# Patient Record
Sex: MALE | Race: WHITE | NOT HISPANIC OR LATINO | URBAN - METROPOLITAN AREA
[De-identification: names, ages, dates, MRNs, and addresses within clinical notes are randomized per-mention and may not be internally consistent; named-entity substitution may affect disease eponyms.]

---

## 2018-01-24 ENCOUNTER — OFFICE VISIT CONVERTED (OUTPATIENT)
Dept: CARDIOLOGY | Facility: CLINIC | Age: 83
End: 2018-01-24
Attending: INTERNAL MEDICINE

## 2018-02-05 ENCOUNTER — OFFICE VISIT CONVERTED (OUTPATIENT)
Dept: PULMONOLOGY | Facility: CLINIC | Age: 83
End: 2018-02-05
Attending: PHYSICIAN ASSISTANT

## 2018-03-14 ENCOUNTER — OFFICE VISIT CONVERTED (OUTPATIENT)
Dept: PULMONOLOGY | Facility: CLINIC | Age: 83
End: 2018-03-14
Attending: PHYSICIAN ASSISTANT

## 2018-03-27 ENCOUNTER — OFFICE VISIT CONVERTED (OUTPATIENT)
Dept: PULMONOLOGY | Facility: CLINIC | Age: 83
End: 2018-03-27
Attending: INTERNAL MEDICINE

## 2021-05-16 VITALS
DIASTOLIC BLOOD PRESSURE: 56 MMHG | HEIGHT: 67 IN | HEART RATE: 72 BPM | WEIGHT: 221 LBS | SYSTOLIC BLOOD PRESSURE: 124 MMHG | BODY MASS INDEX: 34.69 KG/M2

## 2021-05-28 VITALS
TEMPERATURE: 98.2 F | DIASTOLIC BLOOD PRESSURE: 86 MMHG | RESPIRATION RATE: 14 BRPM | HEART RATE: 77 BPM | RESPIRATION RATE: 14 BRPM | BODY MASS INDEX: 34.06 KG/M2 | DIASTOLIC BLOOD PRESSURE: 60 MMHG | HEIGHT: 67 IN | HEART RATE: 71 BPM | OXYGEN SATURATION: 100 % | OXYGEN SATURATION: 91 % | BODY MASS INDEX: 34.06 KG/M2 | WEIGHT: 217 LBS | SYSTOLIC BLOOD PRESSURE: 110 MMHG | TEMPERATURE: 98.6 F | SYSTOLIC BLOOD PRESSURE: 117 MMHG | WEIGHT: 217 LBS | HEIGHT: 67 IN

## 2021-05-28 VITALS
WEIGHT: 224.37 LBS | SYSTOLIC BLOOD PRESSURE: 116 MMHG | BODY MASS INDEX: 36.06 KG/M2 | HEIGHT: 66 IN | RESPIRATION RATE: 18 BRPM | TEMPERATURE: 98.2 F | HEART RATE: 78 BPM | DIASTOLIC BLOOD PRESSURE: 98 MMHG | OXYGEN SATURATION: 93 %

## 2021-05-28 NOTE — PROGRESS NOTES
Patient: DAYANA MULTANI     Acct: [id number]     Report: #[id number]  UNIT #: [id number]     : 1932    Encounter Date:2018  PRIMARY CARE: ILEANA CERNA  ***Signed***  --------------------------------------------------------------------------------------------------------------------  Chief Complaint      Encounter Date      2018            Referring Provider      SELF,REFERRED PATIENT            Patient Complaint      Patient is complaining of      acute visit soa            VITALS      Height 5 ft 7 in / 170.18 cm      Weight 217 lbs 0 oz / 98.838199 kg      BSA 2.19 m2      BMI 34.0 kg/m2      Temperature 98.6 F / 37 C - Oral      Pulse 77      Respirations 14      Blood Pressure 110/86 Sitting, Right Arm      Pulse Oximetry 100%, 2      Exhaled Nitrous Oxide Testing:  10            HPI      The patient is an 85 year old white male who presented to the office in a     wheelchair on his chronic 2 liters of nasal cannula accompanied by his wife and     his daughter.  He is here with complaints of worsening shortness of breath and     dyspnea on exertion for the past week or so.  His shortness of breath is worse     if he gets up to use the bedside commode at night and relieved with rest.  He     cannot complete any of his activities of daily living without increased     shortness of breath at this time.  He is also complaining of a cough and     wheezing.  His wife reports that he normally coughs up clear sputum everyday,     but his sputum changed to yellow in the past three days. He denies fever, chills    , nausea or vomiting. He does have chronic diastolic heart failure, but denies     any increased edema, abdominal distention or orthopnea.  He is being followed     closely by Dr. Adames from cardiology and was seen last week without any changes     made to his diuretic regimen.  I reviewed his review of systems, medical,     surgical and family history and agree with those as entered in  the chart.            ROS      Constitutional:  Denies: Fatigue, Fever, Weight gain, Weight loss, Chills,     Insomnia, Other      Respiratory/Breathing:  Complains of: Shortness of air, Wheezing, Cough, Denies    : Hemoptysis, Pleuritic pain, Other      Endocrine:  Denies: Polydipsia, Polyuria, Heat/cold intolerance, Diabetes, Other      Eyes:  Denies: Blurred vision, Vision Changes, Other      Ears, nose, mouth, throat:  Denies: Mouth lesions, Thrush, Throat pain,     Hoarseness, Allergies/Hay Fever, Post Nasal Drip, Headaches, Recent Head Injury    , Nose Bleeding, Neck Stiffness, Thyroid Mass, Hearing Loss, Ear Fullness, Dry     Mouth, Nasal or Sinus Pain, Dry Lips, Nasal discharge, Nasal congestion, Other      Cardiovascular:  Denies: Palpitations, Syncope, Claudication, Chest Pain, Wake     up Gasping for air, Leg Swelling, Irregular Heart Rate, Cyanosis, Dyspnea on     Exertion, Other      Gastrointestinal:  Denies: Nausea, Constipation, Diarrhea, Abdominal pain,     Vomiting, Difficulty Swallowing, Reflux/Heartburn, Dysphagia, Jaundice, Bloating    , Melena, Bloody stools, Other      Genitourinary:  Denies: Urinary frequency, Incontinence, Hematuria, Urgency,     Nocturia, Dysuria, Testicular problems, Other      Musculoskeletal:  Denies: Joint Pain, Joint Stiffness, Joint Swelling, Myalgias    , Other      Hematologic/lymphatic:  DENIES: Lymphadenopathy, Bruising, Bleeding tendencies,     Other      Neurological:  Denies: Headache, Numbness, Weakness, Seizures, Other      Psychiatric:  Denies: Anxiety, Appropriate Effect, Depression, Other      Sleep:  No: Excessive daytime sleep, Morning Headache?, Snoring, Insomnia?,     Stop breathing at sleep?, Other      Integumentary:  Denies: Rash, Dry skin, Skin Warm to Touch, Other      Immunologic/Allergic:  Denies: Latex allergy, Seasonal allergies, Asthma,     Urticaria, Eczema, Other            FAMILY/SOCIAL/MEDICAL HX      Current History      heavy smoker  (2-3 ppd x 40 years). stopped smoking 30 years ago      denies alcohol or drugs      Surgical History:  Yes: AAA Repair, Head Surgery (RT EYE CATARACT), Orthopedic     Surgery (LEFT ARM/HAND TRIGGER RELEASE SURGERY), Vascular Surgery (AAA REPAIR),     No: Abdominal Surgery, Angioplasty, Appendectomy, Back Surgery, Bladder Surgery    , Bowel Surgery, Breast Surgery, CABG, Carotid Stenosis, Cholecystectomy, Ear     Surgery, Eye Surgery, Hernia Surgery, Kidney Surgery, Nose Surgery, Oral Surgery    , Prostatectomy, Rectal Surgery, Spinal Surgery, Testicular Surgery, Throat     Surgery, Valve Replacement, Other Surgeries      Heart - Family Hx:  Father      Diabetes - Family Hx:  Child      Cancer/Type - Family Hx:  Brother (throat)      Other Family Medical History:  Mother (pneumonia)      Is Father Still Living?:  No      Is Mother Still Living?:  No      Social History:  No Tobacco Use, No Alcohol Use, No Recreational Drug use      Smoking status:  Former smoker (2-3 packs/ day/ 50 years/ chew 2-3 years)      Occupation:  farmer      Anticoagulation Therapy:  Yes      Antibiotic Prophylaxis:  No      Medical History:  Yes: Arthritis (HANDS), Broken Bones (hands broke),     Chemotherapy/Cancer (skin), Chronic Bronchitis/COPD, Emphysema, Congestive     Heart Failu, High Blood Pressure, Shortness Of Breath, Sinus Trouble (sinus     ducts taken out 40 years ago), No: Alcoholism, Allergies, Anemia, Asthma, Blood     Disease, Cataracts, Chemical Dependency, Chronic Liver Disease, Colon Trouble,     Colitis, Diverticulitis, Deafness or Ringing Ears ([place]), Convulsions, Depression    , Anxiety, Bipolar Disorder, Diabetes, Epilepsy, Seizures, Forgetfullness,     Glaucoma, Gall Stones, Gout, Head Injury, Heart Attack, Heart Murmur,     Hemorrhoids/Rectal Prob, Hepatitis, Hiatal Hernia, High Cholesterol, HIV (Do     not ask - volu, Jaundice, Kidney or Bladder Disease, Kidney Stones, Migrane     Headaches, Mitral Valve Prolapse,  Night sweats, Phlebitis, Psychiatric Care,     Reflux Disease, Rheumatic Fever, Sexually Transmitted Dis, Skin Disease/Psoriais    /Ecz, Stroke, Thyroid Problem, Tuberculosis or Pos TB Te, Miscellaneous Medical/    oth            Hx Influenza Vaccination:  Yes      Date Influenza Vaccine Given:  Oct 1, 2017      Influenza Vaccine Declined:  No      2 or More Falls Past Year?:  Yes      Fall Past Year with Injury?:  No      Hx Pneumococcal Vaccination:  Yes      Encouraged to follow-up with:  PCP regarding preventative exams.      Chart initiated by      ortiz cox/ ma            ALLERGIES/MEDICATIONS      Allergies:        Coded Allergies:             LEVOFLOXACIN (Verified  Allergy, Unknown, RASH, 2/5/18)      Medications    Last Reconciled on 2/5/18 16:14 by RICKEY LOVE PA-C      predniSONE* (predniSONE*) 10 Mg Tablet      10 MG PO ASDIR, #90 TAB         Prov: Lucila Love PA-C         2/5/18       Amoxicillin/Clavulanic Acid 500/125 (Augmentin 500/125) 1 Each Tablet      500 MG PO BID for 7 Days, #14 TAB         Prov: Lucila Love PA-C         2/5/18       Tiotropium Bromide (Spiriva Respimat 2.5 mcg/Puff) 4 Gm Mist.inhal      2 PUFFS INH RTQDAY, #1 MDI 6 Refills         Prov: Lucila Love PA-C         2/5/18       NEB-Levalbuterol (LEVALBUTEROL HCL) 1.25 Mg/3 Ml Vial.neb      1.25 MG INH RTQ4H Y for SHORTNESS OF BREATH, #180 NEB 6 Refills         Prov: Reggie Jara         2/5/18       Levofloxacin (Levaquin*) 750 Mg Tablet      750 MG PO QDAY for 7 Days, #7 TAB 0 Refills         Prov: Reggie Jara         12/27/17       Cefdinir (CEFDINIR) 300 Mg Cap      300 MG PO BID for 5 Days, #10 CAP         Prov: Reggie Jara         12/5/17       Trimethoprim/Sulfamethoxazole DS (Bactrim /160 MG*) 1 Each Tablet      1 TAB PO BID for 7 Days, #14 TAB 0 Refills         Prov: Reggie Jara         10/30/17       Trimethoprim/Sulfamethoxazole DS (Bactrim /160 MG*) 1 Each Tablet      1 TAB PO QDAY for 7 Days,  #7 TAB 0 Refills         Prov: MarekReggie         10/24/17       Albuterol/Ipratropium (Duoneb) 3 Ml Ampul.neb      3 ML INH RTQID, #120 NEB 9 Refills         Prov: MarekReggie         10/20/17       MDI-Albuterol (Ventolin HFA*) 18 Gm Hfa.aer.ad      2 PUFFS INH Q4-6H Y for SHORTNESS OF BREATH, #1 MDI 3 Refills         Prov: TashiReggie truong         10/20/17       Citalopram HBr (CeleXA) 10 Mg Tablet      10 MG PO QDAY, #30 TAB 0 Refills         Reported         10/11/17       Spironolactone (Spironolactone*) 50 Mg Tablet      25 MG PO QDAY, #30 TAB 5 Refills         Prov: TashiReggie truong         10/11/17       Ipratropium Bromide (Atrovent HFA 17 Mcg) 12.9 Gm Hfa.aer.ad      2 PUFF INH RTTID, #1 INH         Prov: TashiReggie truong         9/26/17       Neb-Budesonide (Budesonide) 0.5 Mg/2 Ml Ampul.neb      0.5 MG INH RTBID, #60 NEB         Prov: KELVIN CARABALLO         8/28/17       Iron Polysaccharides Complex (Ferrex-150) 150 Mg Capsule      150 MG PO QDAY, #30 CAP         Prov: KELVIN CARABALLO         8/28/17       Ondansetron HCl (Zofran*) 4 Mg Tablet      4 MG PO BID Y for NAUSEA, #20 TAB 0 Refills         Prov: KELVIN CARABALLO         8/28/17       Arformoterol Tartrate (Brovana) 15 Mcg/2 Ml Vial.neb      15 MCG INH RTBID, #60 NEB         Prov: KELVIN CARABALLO         8/28/17       Metolazone (Zaroxolyn*) 5 Mg Tablet      5 MG PO tuesday and Friday for 30 Days, #30 TAB 5 Refills         Prov: OSVALDO MOULTON         8/28/17       Rosuvastatin Calcium (Crestor*) 10 Mg Tablet      10 MG PO HS, #30 TAB 6 Refills         Prov: OSVALDO MOULTON         8/28/17       Warfarin Sod (Coumadin) 1 Mg Tablet      1 MG PO QDAY@16, #30 TAB 5 Refills         Prov: OSVALDO MOULTON         8/28/17       Diltiazem CD (Cardizem CD) 120 Mg Capcr      120 MG PO QDAY@12, #30 CAP.ER 5 Refills         Prov: OSVALDO MOULTON         8/28/17       Carvedilol (Carvedilol) 25 Mg Tablet      50 MG PO BID, #120 TAB 5 Refills         Prov: OSVALDO MOULTON          8/28/17       Torsemide (Demadex) 20 Mg Tablet      20 MG PO QDAY, #30 TAB         Prov: OSVALDO MOULTON         8/28/17       Azithromycin (Azithromycin) 250 Mg Tablet      250 MG PO Q24HR, #30 TAB 9 Refills         Prov: Reggie Jara         6/2/17       Tamsulosin HCL (Flomax*) 0.4 Mg Cap.er.24h      0.4 MG PO HS, #30 CAP 0 Refills         Reported         6/2/17       Senna (Senokot) 8.6 Mg Tablet      8.6 MG PO QDAY, #30 TAB         Prov: STEFANIA MIRZAA         4/21/17       Polyethylene Glycol (Miralax*) 17 Gm Packet      17 GM PO QDAY Y for CONSTIPATION, #30 PACKET         Prov: STEFANIA MIRZAA         4/21/17       Wilbur-Fluticasone (Fluticasone 50 mcg) 16 Gm Spray.susp      2 PUFFS NARE EACH QDAY, #1 BOTTLE 0 Refills         Reported         4/13/17       Acetaminophen* (Tylenol*) 325 Mg Tablet      650 MG PO BID, TAB         Reported         4/13/17       Ranitidine Hcl (Ranitidine*) 150 Mg Tablet      150 MG PO BID, #60 TAB 0 Refills         Reported         4/13/17       Levothyroxine (Levothyroxine) 0.1 Mg Tablet      0.1 MG PO QDAY@07, #30 TAB 0 Refills         Reported         4/13/17      Current Medications      Current Medications Reviewed 2/5/18            EXAM      CONSTITUTIONAL: Pleasant  normal conversant.       EYES : Pink conjunctive, no ptosis, PERRL.       ENMT : Nose and ears appear normal, normal dentition, mild posterior pharyngeal     wall erythema, no sinus tenderness. Mallampati classification       Neck: Nontender, no masses, no thyromegaly, no nodules.      Resp : Decreased breath sounds to auscultation throughout with trace expiratory     wheezes appreciated.        CVS  : No carotid bruits, s1s2 nl, RRR, no murmur, rubs or gallop, no     peripheral edema       Chest wall: Normal rise with inspiration, nontender on palpation.      GI   : Abdomen soft, with no masses, no hepatosplenomegaly, no hernias, BS+      MSK  : Bilateral lower extremities are warm and dry with trace pitting edema.         Skin : No rashes, ulcerations or lesions, normal turgor and temperature      Neuro: CN II - XII intact, no sensory deficits, DTRs intact and symmetrical, no     motor weakness      Psych: Appropriate affect, A   Vtials      Vitals:             Height 5 ft 7 in / 170.18 cm           Weight 217 lbs 0 oz / 98.930002 kg           BSA 2.19 m2           BMI 34.0 kg/m2           Temperature 98.6 F / 37 C - Oral           Pulse 77           Respirations 14           Blood Pressure 110/86 Sitting, Right Arm           Pulse Oximetry 100%, 2            REVIEW      Results Reviewed      PCCS Results Reviewed?:  Yes Prev Lab Results, Yes Prev Radiology Results, Yes     Previous [facility] Records      Lab Results      I have personally reviewed his prior provider notes, lab work and imaging.      Radiographic Results               [facility] Diagnostic Img                PACS RADIOLOGY REPORT            Patient: DAYANA MULTANI   Acct: #[id number]   Report: #[id number]            UNIT #: [id number]    DOS: 18 1659      INSURANCE:MEDICARE PART A   LOCATION:[facility]     : 1932            PROVIDERS      ADMITTING:     ATTENDING: Lucila Love PA-C      FAMILY:  NONE,MD   ORDERING:  Lucila Love PA-C         OTHER:    DICTATING:  Amado Mitchell MD            REQ #:[id number]   EXAM:CXR2 - CHEST 2V AP PA LAT      REASON FOR EXAM:        REASON FOR VISIT:  PULMONARY DISEASE/BRONCHITIS/RESPIRATORY FAILURE            *******Signed******         PROCEDURE:   CHEST AP/PA AND LATERAL             COMPARISON:   [facility], , CXR PORTABLE, 2017, 13:28.             INDICATIONS:   COPD, CKD AND CHF. CHRONIC SOA, WORSE IN THE LAST 2 WEEKS.             FINDINGS:         LUNGS:   Chronic lung disease unchanged      VASCULATURE:   Normal.  Unremarkable pulmonary vasculature.        CARDIAC:   Normal.  No cardiac silhouette abnormality or cardiomegaly.        MEDIASTINUM:    Normal.  No visible mass or adenopathy.        PLEURA:   Normal.  No effusion or pleural thickening.        BONES:   Normal.  No fracture or visible bony lesion.        OTHER:   Negative.               CONCLUSION:         1. No acute disease in the chest. chronic lung disease unchanged              BUNNY ALLISON MD             Electronically Signed and Approved By: BUNNY ALLISON MD on 2/05/2018 at     17:24                        Until signed, this is an unconfirmed preliminary report that may contain      errors and is subject to change.                                              BRITTA:      D:02/05/18 1724            PLAN      Assessment      COPD with acute exacerbation - J44.1            Cavitating mass in left upper lung lobe - R91.8            Hypoxia - R09.02            Acute bronchitis - J20.9            Diastolic CHF, acute on chronic - I50.33            CKD (chronic kidney disease) stage 3, GFR 30-59 ml/min - N18.3            Chronic respiratory failure with hypoxia - J96.11            Notes      New Medications      * TIOTROPIUM BROMIDE (Spiriva Respimat 2.5 mcg/Puff) 4 GM MIST.INHAL: 2 PUFFS     INH RTQDAY #1       Dx: COPD with acute exacerbation - J44.1      * Amoxicillin/Clavulanic Acid 500/125 (Augmentin 500/125) 1 EACH TABLET: 500 MG     PO BID 7 Days #14       Dx: COPD with acute exacerbation - J44.1      * predniSONE* 10 MG TABLET: 10 MG PO ASDIR #90       Instructions: 62wbh5m,32arp1j,43txe8c,56uil3q,25pfb8u       Dx: COPD with acute exacerbation - J44.1      New Diagnostics      * Chest 2 View, As Soon As Possible       Dx: COPD with acute exacerbation - J44.1      * CBC, As Soon As Possible       Dx: COPD with acute exacerbation - J44.1      * Sputum Culture W/Gram Stain, As Soon As Possible       Dx: COPD with acute exacerbation - J44.1      New Office Procedures      * Solu-Medrol 125 MG, As Soon As Possible       METHYLPRED SOD SUCC (Solu-Medrol) 125 MG VIAL: 125 MILLIGRAM  INTRAMUSC Qty 1     VIAL       Dx: COPD with acute exacerbation - J44.1      ASSESSMENT:       1.  COPD with acute exacerbation.      2.  Chronic respiratory failure with hypoxia, chronically on 2 liters nasal     cannula.      3. Cavitating mass in the left upper lung lobe.      4.  Acute bronchitis.      5.  Chronic diastolic congestive heart failure.      6.  Chronic kidney disease, stage 3.        7.  Coughing.      8.  Wheezing.      9.  Dyspnea on exertion.            PLAN:        1.  A this time I will check a two view chest x-ray, sputum culture and CBC for     him.        2.  I will also give him a one time dose of IM Solu Medrol 125 mg in the office     today as well as a DuoNeb in the office today.  I have instructed him to     continue taking his Brovana and Pulmicort twice daily and DuoNebs as needed.       3.  I am also starting him on Spiriva Respimat 2.5 two puffs daily and I have     given  him samples of this and shown him how to use that today.       4.  I am also starting him on Augmentin 500/25  twice a day for 7 days as well     as a month long prednisone taper.        5.  I have instructed him and his family to call the office for any questions     or concerns or if he is not improving in the next 48/72 hours.  They verbalized     their understanding of these plans.            Patient Education      Patient Education Provided:  Acute Bronchitis, COPD            Patient Education:        Acute Bronchitis                 Disclaimer: Converted document may not contain table formatting or lab diagrams. Please see [employer] for the authenticated document.

## 2021-05-28 NOTE — PROGRESS NOTES
Patient: DAYANA MULTANI     Acct: [id number]     Report: #[id number]  UNIT #: [id number]     : 1932    Encounter Date:2018  PRIMARY CARE: ILEANA CERNA  ***Signed***  --------------------------------------------------------------------------------------------------------------------  Chief Complaint      Encounter Date      Mar 14, 2018            Referring Provider      SELF,REFERRED PATIENT            Patient Complaint      Patient is complaining of       Shortness Of Air, Weight Gain, Cough            VITALS      Height 5 ft 7 in / 170.18 cm      Weight 217 lbs 0 oz / 98.499454 kg      BSA 2.19 m2      BMI 34.0 kg/m2      Temperature 98.2 F / 36.78 C - Oral      Pulse 71      Respirations 14      Blood Pressure 117/60 Sitting, Right Arm      Pulse Oximetry 91%, roomair@rest      Exhaled Nitrous Oxide Testing:  10            HPI      The patient is a 85 year old white male who presents with his daughter today     for a complaint of worsening dyspnea on exertion as well as cough productive of     white to yellow sputum and wheezing. They report that these symptoms have been     going on for about 3-4 days. He also admits to some mild lower extremity edema     but states that with his diuretics he lost 3 pounds overnight. He is followed     closely by Dr. Adames for his diastolic congestive heart failure.  He denies     fevers or chills or body aches, denies hemoptysis or chest tightness. He     reports that he is still using Brovana and Pulmicort nebs as scheduled twice     daily and has been using DuoNeb several times per day in between. He is also     using Spiriva Respimat two puffs daily. He is primarily in a wheelchair but     becomes more short of breath with exertion, better with rest.             I have personally reviewed the Review of Systems, past family, social, surgical     and medical histories and I agree with the findings.            ROS      Constitutional:  Denies: Fatigue, Fever,  Weight gain, Weight loss, Chills,     Insomnia, Other      Respiratory/Breathing:  Complains of: Shortness of air, Denies: Wheezing, Cough    , Hemoptysis, Pleuritic pain, Other      Endocrine:  Denies: Polydipsia, Polyuria, Heat/cold intolerance, Diabetes, Other      Eyes:  Denies: Blurred vision, Vision Changes, Other      Ears, nose, mouth, throat:  Denies: Congestion, Dysphagia, Hearing Changes,     Nose Bleeding, Nasal Discharge, Throat pain, Tinnitus, Other      Cardiovascular:  Denies: Chest Pain, Exertional dyspnea, Peripheral Edema,     Palpitations, Syncope, Wake up Gasping for air, Orthopnea, Tachycardia, Other      Gastrointestinal:  Denies: Abdominal pain/cramping, Bloody stools, Constipation    , Diarrhea, Melena, Nausea, Vomiting, Other      Genitourinary:  Denies: Dysuria, Urinary frequency, Incontinence, Hematuria,     Urgency, Other      Musculoskeletal:  Denies: Joint Pain, Joint Stiffness, Joint Swelling, Myalgias    , Other      Hematologic/lymphatic:  DENIES: Lymphadenopathy, Bruising, Bleeding tendencies,     Other      Neurologic:  Denies: Headache, Numbness, Weakness, Seizures, Other      Psychiatric:  Denies: Anxiety, Appropriate Effect, Depression, Other      Sleep:  No: Excessive daytime sleep, Morning Headache?, Snoring, Insomnia?,     Stop breathing at sleep?, Other      Integumentary:  Denies: Rash, Dry skin, Skin Warm to Touch, Other            FAMILY/SOCIAL/MEDICAL HX      Current History      heavy smoker (2-3 ppd x 40 years). stopped smoking 30 years ago      denies alcohol or drugs      Surgical History:  Yes: AAA Repair, Head Surgery (RT EYE CATARACT), Orthopedic     Surgery (LEFT ARM/HAND TRIGGER RELEASE SURGERY), Vascular Surgery (AAA REPAIR),     No: Abdominal Surgery, Angioplasty, Appendectomy, Back Surgery, Bladder Surgery    , Bowel Surgery, Breast Surgery, CABG, Carotid Stenosis, Cholecystectomy, Ear     Surgery, Eye Surgery, Hernia Surgery, Kidney Surgery, Nose Surgery,  Oral Surgery    , Prostatectomy, Rectal Surgery, Spinal Surgery, Testicular Surgery, Throat     Surgery, Valve Replacement, Other Surgeries      Heart - Family Hx:  Father      Diabetes - Family Hx:  Child      Cancer/Type - Family Hx:  Brother (throat)      Other Family Medical History:  Mother (pneumonia)      Is Father Still Living?:  No      Is Mother Still Living?:  No      Social History:  No Tobacco Use, No Alcohol Use, No Recreational Drug use      Smoking status:  Former smoker (2-3 packs/ day/ 50 years/ chew 2-3 years)      Occupation:  farmer      Anticoagulation Therapy:  Yes      Antibiotic Prophylaxis:  No      Medical History:  Yes: Arthritis (HANDS), Broken Bones (hands broke),     Chemotherapy/Cancer (skin), Chronic Bronchitis/COPD, Emphysema, Congestive     Heart Failu, High Blood Pressure, Shortness Of Breath, Sinus Trouble (sinus     ducts taken out 40 years ago), No: Alcoholism, Allergies, Anemia, Asthma, Blood     Disease, Cataracts, Chemical Dependency, Chronic Liver Disease, Colon Trouble,     Colitis, Diverticulitis, Deafness or Ringing Ears ([place]), Convulsions, Depression    , Anxiety, Bipolar Disorder, Diabetes, Epilepsy, Seizures, Forgetfullness,     Glaucoma, Gall Stones, Gout, Head Injury, Heart Attack, Heart Murmur,     Hemorrhoids/Rectal Prob, Hepatitis, Hiatal Hernia, High Cholesterol, HIV (Do     not ask - volu, Jaundice, Kidney or Bladder Disease, Kidney Stones, Migrane     Headaches, Mitral Valve Prolapse, Night sweats, Phlebitis, Psychiatric Care,     Reflux Disease, Rheumatic Fever, Sexually Transmitted Dis, Skin Disease/Psoriais    /Ecz, Stroke, Thyroid Problem, Tuberculosis or Pos TB Te, Miscellaneous Medical/    oth            Hx Influenza Vaccination:  Yes      Date Influenza Vaccine Given:  Oct 1, 2017      Influenza Vaccine Declined:  No      2 or More Falls Past Year?:  Yes      Fall Past Year with Injury?:  No      Hx Pneumococcal Vaccination:  Yes      Encouraged to  follow-up with:  PCP regarding preventative exams.      Chart initiated by      gurdeep price ma            ALLERGIES/MEDICATIONS      Allergies:        Coded Allergies:             LEVOFLOXACIN (Verified  Allergy, Unknown, RASH, 3/14/18)      Medications    Last Reconciled on 3/14/18 11:56 by RICKEY PETERSON PA-C      Trimethoprim/Sulfamethoxazole DS (Bactrim /160 MG*) 1 Each Tablet      1 TAB PO BID for 7 Days, #14 TAB 0 Refills         Prov: Lucila Peterson PA-C         3/14/18       predniSONE* (predniSONE*) 20 Mg Tablet      40 MG PO QDAY for 7 Days, #14 TAB 0 Refills         Prov: Lucila Peterson PA-C         3/14/18       Rosuvastatin Calcium (Crestor*) 10 Mg Tablet      10 MG PO HS, #30 TAB 0 Refills         Reported         3/14/18       Tiotropium Bromide (Spiriva Respimat 2.5 mcg/Puff) 4 Gm Mist.inhal      2 PUFFS INH RTQDAY, #1 MDI 6 Refills         Prov: Lucila Peterson PA-C         2/5/18       NEB-Levalbuterol (LEVALBUTEROL HCL) 1.25 Mg/3 Ml Vial.neb      1.25 MG INH RTQ4H Y for SHORTNESS OF BREATH, #180 NEB 6 Refills         Prov: Reggie Jara         2/5/18       Albuterol/Ipratropium (Duoneb) 3 Ml Ampul.neb      3 ML INH RTQID, #120 NEB 9 Refills         Prov: Reggie Jara         10/20/17       MDI-Albuterol (Ventolin HFA*) 18 Gm Hfa.aer.ad      2 PUFFS INH Q4-6H Y for SHORTNESS OF BREATH, #1 MDI 3 Refills         Prov: Reggie Jara         10/20/17       Citalopram HBr (CeleXA) 10 Mg Tablet      10 MG PO QDAY, #30 TAB 0 Refills         Reported         10/11/17       Spironolactone (Spironolactone*) 50 Mg Tablet      25 MG PO QDAY, #30 TAB 5 Refills         Prov: Reggie Jara         10/11/17       Ipratropium Bromide (Atrovent HFA 17 Mcg) 12.9 Gm Hfa.aer.ad      2 PUFF INH RTTID, #1 INH         Prov: Reggie Jara         9/26/17       Neb-Budesonide (Budesonide) 0.5 Mg/2 Ml Ampul.neb      0.5 MG INH RTBID, #60 NEB         Prov: KELVIN CARABALLO         8/28/17       Iron Polysaccharides Complex  (Ferrex-150) 150 Mg Capsule      150 MG PO QDAY, #30 CAP         Prov: KELVIN CARABALLO         8/28/17       Ondansetron HCl (Zofran*) 4 Mg Tablet      4 MG PO BID Y for NAUSEA, #20 TAB 0 Refills         Prov: KELVIN CARABALLO         8/28/17       Arformoterol Tartrate (Brovana) 15 Mcg/2 Ml Vial.neb      15 MCG INH RTBID, #60 NEB         Prov: KELVIN CARABALLO         8/28/17       Metolazone (Zaroxolyn*) 5 Mg Tablet      5 MG PO tuesday and Friday for 30 Days, #30 TAB 5 Refills         Prov: MOU[facility]         8/28/17       Rosuvastatin Calcium (Crestor*) 10 Mg Tablet      10 MG PO HS, #30 TAB 6 Refills         [facility]         8/28/17       Warfarin Sod (Coumadin) 1 Mg Tablet      1 MG PO QDAY@16, #30 TAB 5 Refills         Prov: [facility]         8/28/17       Diltiazem CD (Cardizem CD) 120 Mg Capcr      120 MG PO QDAY@12, #30 CAP.ER 5 Refills         Prov: MOULTON,[facility]         8/28/17       Carvedilol (Carvedilol) 25 Mg Tablet      50 MG PO BID, #120 TAB 5 Refills         [facility]         8/28/17       Torsemide (Demadex) 20 Mg Tablet      20 MG PO QDAY, #30 TAB         [facility]         8/28/17       Azithromycin (Azithromycin) 250 Mg Tablet      250 MG PO Q24HR, #30 TAB 9 Refills         Prov: Reggie Jara         6/2/17       Tamsulosin HCL (Flomax*) 0.4 Mg Cap.er.24h      0.4 MG PO HS, #30 CAP 0 Refills         Reported         6/2/17       Senna (Senokot) 8.6 Mg Tablet      8.6 MG PO QDAY, #30 TAB         Prov: ANDRES MIRZA         4/21/17       Polyethylene Glycol (Miralax*) 17 Gm Packet      17 GM PO QDAY Y for CONSTIPATION, #30 PACKET         Prov: ANDRES MIRZA         4/21/17       Wilbur-Fluticasone (Fluticasone 50 mcg) 16 Gm Spray.susp      2 PUFFS NARE EACH QDAY, #1 BOTTLE 0 Refills         Reported         4/13/17       Acetaminophen* (Tylenol*) 325 Mg Tablet      650 MG PO BID, TAB         Reported         4/13/17       Ranitidine Hcl (Ranitidine*) 150 Mg Tablet       150 MG PO BID, #60 TAB 0 Refills         Reported         4/13/17       Levothyroxine (Levothyroxine) 0.1 Mg Tablet      0.1 MG PO QDAY@07, #30 TAB 0 Refills         Reported         4/13/17      Current Medications      Current Medications Reviewed 3/14/18            EXAM      CONSTITUTIONAL: Pleasant male in no acute distress,  normal conversant.       EYES : Pink conjunctive, no ptosis, PERRL.       ENMT : Nose and ears appear normal, normal dentition, mild posterior pharyngeal     wall erythema. Mallampati classification       Neck: Nontender, no masses, no thyromegaly, no nodules.      Resp : Mildly decreased breath sounds throughout, resonant to percussion     bilaterally, no wheezing, crackles or rhonchi.      CVS  : No carotid bruits, s1s2 nl, RRR, no murmur, rubs or gallop, trace to +1     pitting edema       Chest wall: Normal rise with inspiration, nontender on palpation      GI   : Abdomen soft, with no masses, no hepatosplenomegaly, no hernias, BS+      MSK  : Normal gait and station, no digital cyanosis or clubbing, bilateral     lower extremities are warm and dry      Skin : No rashes, ulcerations or lesions, normal turgor and temperature      Neuro: CN II - XII intact, no sensory deficits, DTRs intact and symmetrical, no     motor weakness      Psych: Appropriate affect, A   Vitals      Vitals:             Height 5 ft 7 in / 170.18 cm           Weight 217 lbs 0 oz / 98.118127 kg           BSA 2.19 m2           BMI 34.0 kg/m2           Temperature 98.2 F / 36.78 C - Oral           Pulse 71           Respirations 14           Blood Pressure 117/60 Sitting, Right Arm           Pulse Oximetry 91%, roomair@rest            REVIEW      Results Reviewed      P[facility]S Results Reviewed?:  Yes Prev Lab Results, Yes Prev Radiology Results, Yes     [facility]            PLAN      Assessment      Notes      New Medications      * Rosuvastatin Calcium (Crestor*) 10 MG TABLET: 10 MG PO HS #30      *  predniSONE* 20 MG TABLET: 40 MG PO QDAY 7 Days #14      * Trimethoprim/Sulfamethoxazole DS (Bactrim /160 MG*) 1 EACH TABLET: 1     TAB PO BID 7 Days #14      * Ciprofloxacin HCl (Ciprofloxacin*) 750 MG TABLET: 750 MG PO BID 14 Days #28       Instructions: 1 TAB      * LACTOBAC CMB #3/FOS/PANTETHINE (Probiotic   1 EACH PO BID 14 Days #28      * Neb-Tobramycin (Carlos 300 mg/5 ml Solution) 300 MG/5 ML AMPUL.NEB: 300 MG INH     RTQ12H 30 Days #60       Instructions: DIAGNOSIS CODE REQUIRED PRIOR TO PRESCRIBING.      Renewed Medications      * Albuterol/Ipratropium (Duoneb) 3 ML AMPUL.NEB: 3 ML INH RTQID #120       Instructions: DIAGNOSIS j44.9 CODE npi: [id number]      * NEB-Levalbuterol (LEVALBUTEROL HCL) 1.25 MG/3 ML VIAL.NEB: 1.25 MG INH RTQ4H     PRN SHORTNESS OF BREATH #180       Instructions: dx:j44.9 copd npi:[id number]       Dx: COPD with acute exacerbation - J44.1      Discontinued Medications      * Trimethoprim/Sulfamethoxazole DS (Bactrim /160 MG*) 1 EACH TABLET: 1     TAB PO QDAY 7 Days #7      * Trimethoprim/Sulfamethoxazole DS (Bactrim /160 MG*) 1 EACH TABLET: 1     TAB PO BID 7 Days #14      * CEFDINIR 300 MG CAP: 300 MG PO BID 5 Days #10      * Levofloxacin (Levaquin*) 750 MG TABLET: 750 MG PO QDAY 7 Days #7      * Amoxicillin/Clavulanic Acid 500/125 (Augmentin 500/125) 1 EACH TABLET: 500 MG     PO BID 7 Days #14       Dx: COPD with acute exacerbation - J44.1      * predniSONE* 10 MG TABLET: 10 MG PO ASDIR #90       Instructions: [id number],27gzq5l,30lyp6m,20abj4f,90fsk5g       Dx: COPD with acute exacerbation - J44.1      New Office Procedures      * Solu-Medrol 62.5 MG, As Soon As Possible       METHYLPRED SOD SUCC (Solu-Medrol) 125 MG VIAL: 62.5 MILLIGRAM INTRAMUSC Qty 1     VIAL      ASSESSMENT:       1.  COPD with acute exacerbation.      2.  Chronic respiratory failure with hypoxia, chronically on 2 liters nasal     cannula.      3. Probable acute bronchitis.      4. Cavitating mass  in left upper lobe.       5.  Chronic diastolic congestive heart failure.      6.  Chronic kidney disease, stage III.        7.  Coughing.      8.  Wheezing.      9.  Dyspnea on exertion.            PLAN:        1.  A this time I will have the patient continue his Brovana and Pulmicort     twice daily, continue DuoNeb PRN and Spiriva Respimat two puffs twice daily.         2.  I will start him on Bactrim for a week as his last sputum culture grew     Stenotrophomonas which is susceptible to Bactrim. I will also start him on a     one week Prednisone dose and give him intramuscular shot of Solu-Medrol 62.5 mg      in the office today.       3.  He brought a sputum sample to the laboratory yesterday so I will follow up     on that and adjust his antibiotic therapy if needed.       4. He should keep his next follow up appointment with Dr. Jara in 2 weeks and     to call us sooner if needed.              ADDENDUM:       1.  Patient's sputum culture grew multi-drug resistant Serratia and Psuedomonas    , requiring a different antibiotic. I will discontinue Bactrim and start a 2-    week course of Ciprofloxacin. 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            Patient Education      Patient Education Provided:  Acute Bronchitis, COPD                 Disclaimer: Converted document may not contain table formatting or lab diagrams. Please see [employer] for the authenticated document.

## 2021-05-28 NOTE — PROGRESS NOTES
Patient: DAYANA MULTANI     Acct: [id number]     Report: #[id number]  UNIT #: [id number]     : 1932    Encounter Date:2018  PRIMARY CARE: ILEANA CERNA  ***Signed***  --------------------------------------------------------------------------------------------------------------------  ADDENDUM: 18 1452          Addendum: Reggie Jara on 3/30/18 @ 14:52      Addendum      Chart Notes      Patients sputum culture sensitivity reviewed, showed intermediate sensitivity     to quinolones.       Patient continues to have fever, worsening shortness of breath.      With his current clinical status, would need carbapenem. Start meropenem 1 gm     bid (renally dosed).       Would need it for 20 days, meanwhile continue darian neb and completing cipro.            Chief Complaint      Encounter Date      Mar 27, 2018            Primary Care Provider      IELANA CERNA            Referring Provider      SELF,REFERRED PATIENT            Patient Complaint      Patient is complaining of      5 week follow up            VITALS      Height 5 ft 6 in / 167.64 cm      Weight 224 lbs 6 oz / 101.517801 kg      BSA 2.22 m2      BMI 36.2 kg/m2      Temperature 98.2 F / 36.78 C - Oral      Pulse 78      Respirations 18      Blood Pressure 116/98 Sitting, Right Arm      Pulse Oximetry 93%, nasal cannula, 3 lpm      Exhaled Nitrous Oxide Testing:  10            HPI      The patient is a 85 year old male with chronic obstructive pulmonary disease     with recurrent exacerbations, chronic hypoxic and hypercarbic respiratory     failure, chronic diastolic congestive heart failure and chronic kidney disease.     He is here for follow up.             Since his last office visit he was seen by Lucila Love our PA. At that time a     sputum culture was checked which grew multidrug resistant serratia marcescens     as well as pseudomonas paucimobilis. It was sensitive to levofloxacin and     tobramycin. We do not have the culture  and sensitivity back on pseudomonas. He     is currently taking ciprofloxacin once daily. He has taken it for 1.5 weeks     now. He has no fevers or chills, no nausea and vomiting. No chest pain. He is     coughing up clear phlegm. He has been on spironolactone 25 mg once daily and     torsemide 20 mg once daily. His kidney function has been stable. He follows Dr. Bowers for his chronic kidney disease and Dr. Adames for his congestive heart     failure. He is on Brovana and Pulmicort nebulizers twice daily, DuoNeb     nebulizer 3-4 times and Spiriva once daily. His wife does not know if he is     able to inhale the Spiriva very well.            ROS      Constitutional:  Complains of: Fatigue, Denies: Fever, Weight gain, Weight loss    , Chills, Insomnia, Other      Respiratory/Breathing:  Complains of: Shortness of air, Wheezing, Denies: Cough    , Hemoptysis, Pleuritic pain, Other      Endocrine:  Denies: Polydipsia, Polyuria, Heat/cold intolerance, Diabetes, Other      Eyes:  Denies: Blurred vision, Vision Changes, Other      Ears, nose, mouth, throat:  Denies: Mouth lesions, Thrush, Throat pain,     Hoarseness, Allergies/Hay Fever, Post Nasal Drip, Headaches, Recent Head Injury    , Nose Bleeding, Neck Stiffness, Thyroid Mass, Hearing Loss, Ear Fullness, Dry     Mouth, Nasal or Sinus Pain, Dry Lips, Nasal discharge, Nasal congestion, Other      Cardiovascular:  Denies: Palpitations, Syncope, Claudication, Chest Pain, Wake     up Gasping for air, Leg Swelling, Irregular Heart Rate, Cyanosis, Dyspnea on     Exertion, Other      Gastrointestinal:  Denies: Nausea, Constipation, Diarrhea, Abdominal pain,     Vomiting, Difficulty Swallowing, Reflux/Heartburn, Dysphagia, Jaundice, Bloating    , Melena, Bloody stools, Other      Genitourinary:  Denies: Urinary frequency, Incontinence, Hematuria, Urgency,     Nocturia, Dysuria, Testicular problems, Other      Musculoskeletal:  Denies: Joint Pain, Joint Stiffness, Joint  Swelling, Myalgias    , Other      Hematologic/lymphatic:  DENIES: Lymphadenopathy, Bruising, Bleeding tendencies,     Other      Neurological:  Denies: Headache, Numbness, Weakness, Seizures, Other      Psychiatric:  Denies: Anxiety, Appropriate Effect, Depression, Other      Sleep:  No: Excessive daytime sleep, Morning Headache?, Snoring, Insomnia?,     Stop breathing at sleep?, Other      Integumentary:  Denies: Rash, Dry skin, Skin Warm to Touch, Other      Immunologic/Allergic:  Denies: Latex allergy, Seasonal allergies, Asthma,     Urticaria, Eczema, Other      Immunization status:  No: Up to date            FAMILY/SOCIAL/MEDICAL HX      Current History      heavy smoker (2-3 ppd x 40 years). stopped smoking 30 years ago      denies alcohol or drugs      Surgical History:  Yes: AAA Repair, Head Surgery (RT EYE CATARACT), Orthopedic     Surgery (LEFT ARM/HAND TRIGGER RELEASE SURGERY), Vascular Surgery (AAA REPAIR),     No: Abdominal Surgery, Angioplasty, Appendectomy, Back Surgery, Bladder Surgery    , Bowel Surgery, Breast Surgery, CABG, Carotid Stenosis, Cholecystectomy, Ear     Surgery, Eye Surgery, Hernia Surgery, Kidney Surgery, Nose Surgery, Oral Surgery    , Prostatectomy, Rectal Surgery, Spinal Surgery, Testicular Surgery, Throat     Surgery, Valve Replacement, Other Surgeries      Heart - Family Hx:  Father      Diabetes - Family Hx:  Child      Cancer/Type - Family Hx:  Brother (throat)      Other Family Medical History:  Mother (pneumonia)      Is Father Still Living?:  No      Is Mother Still Living?:  No      Social History:  No Tobacco Use, No Alcohol Use, No Recreational Drug use      Smoking status:  Former smoker (2-3 packs/ day/ 50 years/ chew 2-3 years)      Occupation:  farmer      Anticoagulation Therapy:  Yes      Antibiotic Prophylaxis:  No      Medical History:  Yes: Arthritis (HANDS), Broken Bones (hands broke),     Chemotherapy/Cancer (skin), Chronic Bronchitis/COPD, Emphysema,  Congestive     Heart Failu, High Blood Pressure, Shortness Of Breath, Sinus Trouble (sinus     ducts taken out 40 years ago), No: Alcoholism, Allergies, Anemia, Asthma, Blood     Disease, Cataracts, Chemical Dependency, Chronic Liver Disease, Colon Trouble,     Colitis, Diverticulitis, Deafness or Ringing Ears (Pala), Convulsions, Depression    , Anxiety, Bipolar Disorder, Diabetes, Epilepsy, Seizures, Forgetfullness,     Glaucoma, Gall Stones, Gout, Head Injury, Heart Attack, Heart Murmur,     Hemorrhoids/Rectal Prob, Hepatitis, Hiatal Hernia, High Cholesterol, HIV (Do     not ask - volu, Jaundice, Kidney or Bladder Disease, Kidney Stones, Migrane     Headaches, Mitral Valve Prolapse, Night sweats, Phlebitis, Psychiatric Care,     Reflux Disease, Rheumatic Fever, Sexually Transmitted Dis, Skin Disease/Psoriais    /Ecz, Stroke, Thyroid Problem, Tuberculosis or Pos TB Te, Miscellaneous Medical/    oth            Hx Influenza Vaccination:  Yes      Date Influenza Vaccine Given:  Oct 1, 2017      Influenza Vaccine Declined:  No      2 or More Falls Past Year?:  No      Fall Past Year with Injury?:  No      Hx Pneumococcal Vaccination:  Yes      Encouraged to follow-up with:  PCP regarding preventative exams.      Chart initiated by      aidee bahena ma            ALLERGIES/MEDICATIONS      Allergies:        Coded Allergies:             LEVOFLOXACIN (Verified  Allergy, Unknown, RASH, 3/27/18)      Medications    Last Reconciled on 3/27/18 18:41 by SHAILESH JARA MD      Nystatin (Nystatin*) 100,000 Unit/1 Ml Oral.susp      10 ML SWISH.SWAL Q6HR, #120 ML 2 Refills         Prov: Shailesh Jara         3/27/18       Neb-Tobramycin (Carlos 300 mg/5 ml Solution) 300 Mg/5 Ml Ampul.neb      300 MG INH RTQ12H for 30 Days, #60 NEB 2 Refills         Prov: Lucila Love PA-C         3/15/18       Lactobac Cmb #3/Fos/Pantethine (Probiotic   1 EACH PO BID for 14 Days, #28 CAP         Prov: Lucila Love PA-C         3/15/18        Ciprofloxacin HCl (Ciprofloxacin*) 750 Mg Tablet      750 MG PO BID for 14 Days, #28 TAB 0 Refills         Prov: Lucila Love PA-C         3/15/18       NEB-Levalbuterol (LEVALBUTEROL HCL) 1.25 Mg/3 Ml Vial.neb      1.25 MG INH RTQ4H Y for SHORTNESS OF BREATH, #180 NEB 6 Refills         Prov: Lucila Love PA-C         3/14/18       Albuterol/Ipratropium (Duoneb) 3 Ml Ampul.neb      3 ML INH RTQID, #120 NEB 9 Refills         Prov: Lucila Love PA-C         3/14/18       Rosuvastatin Calcium (Crestor*) 10 Mg Tablet      10 MG PO HS, #30 TAB 0 Refills         Reported         3/14/18       MDI-Albuterol (Ventolin HFA*) 18 Gm Hfa.aer.ad      2 PUFFS INH Q4-6H Y for SHORTNESS OF BREATH, #1 MDI 3 Refills         Prov: Reggie Jara         10/20/17       Citalopram HBr (CeleXA) 10 Mg Tablet      10 MG PO QDAY, #30 TAB 0 Refills         Reported         10/11/17       Spironolactone (Spironolactone*) 50 Mg Tablet      25 MG PO QDAY, #30 TAB 5 Refills         Prov: Reggie Jara         10/11/17       Neb-Budesonide (Budesonide) 0.5 Mg/2 Ml Ampul.neb      0.5 MG INH RTBID, #60 NEB         Prov: KELVIN CARABALLO         8/28/17       Iron Polysaccharides Complex (Ferrex-150) 150 Mg Capsule      150 MG PO QDAY, #30 CAP         Prov: KELVIN CARABALLO         8/28/17       Ondansetron HCl (Zofran*) 4 Mg Tablet      4 MG PO BID Y for NAUSEA, #20 TAB 0 Refills         Prov: KELVIN CARABALLO         8/28/17       Arformoterol Tartrate (Brovana) 15 Mcg/2 Ml Vial.neb      15 MCG INH RTBID, #60 NEB         Prov: KELVIN CARABALLO         8/28/17       Rosuvastatin Calcium (Crestor*) 10 Mg Tablet      10 MG PO HS, #30 TAB 6 Refills         Prov: OSVALDO MOULTON         8/28/17       Warfarin Sod (Coumadin) 1 Mg Tablet      1 MG PO QDAY@16, #30 TAB 5 Refills         Prov: OSVALDO MOULTON         8/28/17       Carvedilol (Carvedilol) 25 Mg Tablet      50 MG PO BID, #120 TAB 5 Refills         Prov: OSVALDO MOULTON         8/28/17       Torsemide  (Demadex) 20 Mg Tablet      20 MG PO QDAY, #30 TAB         Prov: OSVALDO MOULTON         8/28/17       Azithromycin (Azithromycin) 250 Mg Tablet      250 MG PO Q24HR, #30 TAB 9 Refills         Prov: Reggie Jara         6/2/17       Tamsulosin HCL (Flomax*) 0.4 Mg Cap.er.24h      0.4 MG PO HS, #30 CAP 0 Refills         Reported         6/2/17       Senna (Senokot) 8.6 Mg Tablet      8.6 MG PO QDAY, #30 TAB         Prov: ANDRES MIRZA         4/21/17       Polyethylene Glycol (Miralax*) 17 Gm Packet      17 GM PO QDAY Y for CONSTIPATION, #30 PACKET         Prov: YUKIANDRES         4/21/17       Wilbur-Fluticasone (Fluticasone 50 mcg) 16 Gm Spray.susp      2 PUFFS NARE EACH QDAY, #1 BOTTLE 0 Refills         Reported         4/13/17       Acetaminophen* (Tylenol*) 325 Mg Tablet      650 MG PO BID, TAB         Reported         4/13/17       Ranitidine Hcl (Ranitidine*) 150 Mg Tablet      150 MG PO BID, #60 TAB 0 Refills         Reported         4/13/17       Levothyroxine (Levothyroxine) 0.1 Mg Tablet      0.1 MG PO QDAY@07, #30 TAB 0 Refills         Reported         4/13/17      Current Medications      Current Medications Reviewed 3/27/18            EXAM      CONSTITUTIONAL: Pleasant male in no acute distress,  normal conversant.       EYES : Pink conjunctive, no ptosis, PERRL.       ENMT : Nose and ears appear normal, normal dentition, mild posterior pharyngeal     wall erythema. Mallampati classification       Neck: Nontender, no masses, no thyromegaly, no nodules.      Resp : Mildly decreased breath sounds throughout, resonant to percussion     bilaterally, no wheezing, crackles or rhonchi.      CVS  : No carotid bruits, s1s2 nl, RRR, no murmur, rubs or gallop, trace to +1     pitting edema       Chest wall: Normal rise with inspiration, nontender on palpation      GI   : Abdomen soft, with no masses, no hepatosplenomegaly, no hernias, BS+      MSK  : Normal gait and station, no digital cyanosis or clubbing, bilateral      lower extremities are warm and dry      Skin : No rashes, ulcerations or lesions, normal turgor and temperature      Neuro: CN II - XII intact, no sensory deficits, DTRs intact and symmetrical, no     motor weakness      Psych: Appropriate affect, A   Vtials      Vitals:             Height 5 ft 6 in / 167.64 cm           Weight 224 lbs 6 oz / 101.157721 kg           BSA 2.22 m2           BMI 36.2 kg/m2           Temperature 98.2 F / 36.78 C - Oral           Pulse 78           Respirations 18           Blood Pressure 116/98 Sitting, Right Arm           Pulse Oximetry 93%, nasal cannula, 3 lpm            REVIEW      Results Reviewed      PCCS Results Reviewed?:  Yes Prev Lab Results, Yes Prev Radiology Results, Yes     Previous [facility] Records      Lab Results      The patient's sputum culture grew serratia marcescens which is multidrug     resistant but sensitive to ciprofloxacin and levofloxacin. The patient's last     kidney function showed serum creatinine of 2.11 done on 03/08/18. White blood     count from 02/15/18 showed white blood count of 13.4.      PFT Results      [id number]                                       [facility]                          [employer]                            [place], [place]  [address]               __________________________________________________________________________             Patient Name:                   Attending Physician:      Yaniv Bain M.D.             Patient Visit # MR #            Admit Date  Disch Date     Location      [id number]    [id number]      02/11/2015                 CVS- -             Date of Birth      06/16/1932      __________________________________________________________________________      0821 - DIAGNOSTIC REPORT             PULMONARY FUNCTION TEST:             DATE:      02/11/15.             FINDINGS:      The spirometry shows FVC, FEV1, FEV1 by FEVC  ratio is decreased to 63, 29 and      45% predicted with no significant improvement in flow with bronchodilator      therapy.  MVV when corrected for FEV1 shows fair effort.  The flow volume      loop is obstructive pattern.             IMPRESSION:      Severe COPD.  No improvement in flow with bronchodilator therapy.             PLAN:      The patient may still benefit a trial with beta-2 agonist.  Repeat this study      with volumes and diffusion.  Correlate this study clinically.             To be electronically signed in [employer]      [id number] RONY LAWRENCE M.D.             KM:arely      D:  02/12/2015 07:04      T:  02/12/2015 07:22      #[id number]             Until signed, this is an unconfirmed preliminary report that may contain      errors and is subject to change.                   02/13/15 0641  <Electronically signed by Rony Lawrence MD>            PLAN      Assessment      CKD (chronic kidney disease) stage 3, GFR 30-59 ml/min - N18.3            Diastolic CHF, acute on chronic - I50.33            Notes      New Medications      * Nystatin (Nystatin*) 100,000 UNIT/1 ML ORAL.SUSP: 10 ML SWISH.SWAL Q6HR #120      Discontinued Medications      * Diltiazem CD (Cardizem CD) 120 MG CAPCR: 120 MG PO QDAY@12 #30      * Metolazone (Zaroxolyn*) 5 MG TABLET: 5 MG PO tuesday and Friday 30 Days #30      * Ipratropium Bromide (Atrovent HFA 17 Mcg) 12.9 GM HFA.AER.AD: 2 PUFF INH     RTTID #1      * TIOTROPIUM BROMIDE (Spiriva Respimat 2.5 mcg/Puff) 4 GM MIST.INHAL: 2 PUFFS     INH RTQDAY #1       Dx: COPD with acute exacerbation - J44.1      * predniSONE* 20 MG TABLET: 40 MG PO QDAY 7 Days #14      New Diagnostics      * Probrain Natriuretic, Routine       Dx: CKD (chronic kidney disease) stage 3, GFR 30-59 ml/min - N18.3      * CBC, Month       Dx: CKD (chronic kidney disease) stage 3, GFR 30-59 ml/min - N18.3      * Comp Metabolic Panel, Month       Dx: CKD (chronic kidney disease) stage 3, GFR 30-59 ml/min - N18.3      *  Sputum Culture W/Gram Stain, 3 Months       Dx: Diastolic CHF, acute on chronic - I50.33      PLAN:       The patient is a 85 year old male with chronic obstructive pulmonary disease     with recurrent exacerbations, chronic hypoxic and hypercarbic respiratory     failure, chronic diastolic congestive heart failure and chronic kidney disease.             1. Chronic obstructive pulmonary disease with recurrent chronic obstructive     pulmonary disease exacerbations: Continue with Brovana and Pulmicort nebulizers     twice daily, Spiriva once daily and DuoNeb 3-4 times a day. If he has     significant issues with Spiriva we will likely need to change it to long acting     muscarinic agent.       2. I will check NT-Pro BNP now. If NT-Pro BNP is high he may need to be     increased on his diuretics. Currently he is on torsemide 20 mg once daily and     spironolactone 25 mg once daily. His serum potassium has been normal.       3. Chronic hypoxic and hypercarbic respiratory failure: Continue oxygen at 2-3     liters per minute with rest and activities. Continue Trilogy with oxygen and     sleep.       4. He needs a new nebulizer machine. We have ordered a new nebulizer machine.       5. Pseudomonas and serratia marcescens pneumonia. I will check the sensitivity     of the pseudomonas paucimobilis. I advised her to continue with ciprofloxacin     twice daily. Continue tobramycin twice daily. He has oral thrush now. Start     nystatin swish and swallow.       6. Check CBC and comprehensive metabolic profile in a month. I will check a     sputum culture in 3 months.       7. I will follow up with him in 3 months, earlier if needed.            Patient Education      Education resources provided:  Yes      Patient Education Provided:  Acute Bronchitis                 Disclaimer: Converted document may not contain table formatting or lab diagrams. Please see [email or web address] for the authenticated document.